# Patient Record
Sex: MALE | Race: BLACK OR AFRICAN AMERICAN | NOT HISPANIC OR LATINO | URBAN - METROPOLITAN AREA
[De-identification: names, ages, dates, MRNs, and addresses within clinical notes are randomized per-mention and may not be internally consistent; named-entity substitution may affect disease eponyms.]

---

## 2022-07-11 ENCOUNTER — EMERGENCY (EMERGENCY)
Facility: HOSPITAL | Age: 42
LOS: 1 days | Discharge: HOME | End: 2022-07-11
Attending: EMERGENCY MEDICINE | Admitting: EMERGENCY MEDICINE
Payer: COMMERCIAL

## 2022-07-11 VITALS
SYSTOLIC BLOOD PRESSURE: 116 MMHG | DIASTOLIC BLOOD PRESSURE: 79 MMHG | RESPIRATION RATE: 18 BRPM | TEMPERATURE: 98 F | OXYGEN SATURATION: 100 % | HEART RATE: 57 BPM

## 2022-07-11 VITALS
RESPIRATION RATE: 17 BRPM | TEMPERATURE: 97 F | SYSTOLIC BLOOD PRESSURE: 121 MMHG | OXYGEN SATURATION: 100 % | DIASTOLIC BLOOD PRESSURE: 96 MMHG | HEART RATE: 51 BPM

## 2022-07-11 LAB
ALBUMIN SERPL ELPH-MCNC: 4.5 G/DL — SIGNIFICANT CHANGE UP (ref 3.3–5)
ALP SERPL-CCNC: 61 U/L — SIGNIFICANT CHANGE UP (ref 40–120)
ALT FLD-CCNC: 13 U/L — SIGNIFICANT CHANGE UP (ref 4–41)
ANION GAP SERPL CALC-SCNC: 8 MMOL/L — SIGNIFICANT CHANGE UP (ref 7–14)
AST SERPL-CCNC: 18 U/L — SIGNIFICANT CHANGE UP (ref 4–40)
BASOPHILS # BLD AUTO: 0.06 K/UL — SIGNIFICANT CHANGE UP (ref 0–0.2)
BASOPHILS NFR BLD AUTO: 1.3 % — SIGNIFICANT CHANGE UP (ref 0–2)
BILIRUB SERPL-MCNC: 0.3 MG/DL — SIGNIFICANT CHANGE UP (ref 0.2–1.2)
BUN SERPL-MCNC: 9 MG/DL — SIGNIFICANT CHANGE UP (ref 7–23)
CALCIUM SERPL-MCNC: 9 MG/DL — SIGNIFICANT CHANGE UP (ref 8.4–10.5)
CHLORIDE SERPL-SCNC: 104 MMOL/L — SIGNIFICANT CHANGE UP (ref 98–107)
CO2 SERPL-SCNC: 27 MMOL/L — SIGNIFICANT CHANGE UP (ref 22–31)
CREAT SERPL-MCNC: 1.21 MG/DL — SIGNIFICANT CHANGE UP (ref 0.5–1.3)
D DIMER BLD IA.RAPID-MCNC: <150 NG/ML DDU — SIGNIFICANT CHANGE UP
EGFR: 76 ML/MIN/1.73M2 — SIGNIFICANT CHANGE UP
EOSINOPHIL # BLD AUTO: 0.12 K/UL — SIGNIFICANT CHANGE UP (ref 0–0.5)
EOSINOPHIL NFR BLD AUTO: 2.5 % — SIGNIFICANT CHANGE UP (ref 0–6)
GLUCOSE SERPL-MCNC: 91 MG/DL — SIGNIFICANT CHANGE UP (ref 70–99)
HCT VFR BLD CALC: 44.8 % — SIGNIFICANT CHANGE UP (ref 39–50)
HGB BLD-MCNC: 13.8 G/DL — SIGNIFICANT CHANGE UP (ref 13–17)
IANC: 2.57 K/UL — SIGNIFICANT CHANGE UP (ref 1.8–7.4)
IMM GRANULOCYTES NFR BLD AUTO: 0.2 % — SIGNIFICANT CHANGE UP (ref 0–1.5)
LYMPHOCYTES # BLD AUTO: 1.45 K/UL — SIGNIFICANT CHANGE UP (ref 1–3.3)
LYMPHOCYTES # BLD AUTO: 30.5 % — SIGNIFICANT CHANGE UP (ref 13–44)
MCHC RBC-ENTMCNC: 26.4 PG — LOW (ref 27–34)
MCHC RBC-ENTMCNC: 30.8 GM/DL — LOW (ref 32–36)
MCV RBC AUTO: 85.7 FL — SIGNIFICANT CHANGE UP (ref 80–100)
MONOCYTES # BLD AUTO: 0.55 K/UL — SIGNIFICANT CHANGE UP (ref 0–0.9)
MONOCYTES NFR BLD AUTO: 11.6 % — SIGNIFICANT CHANGE UP (ref 2–14)
NEUTROPHILS # BLD AUTO: 2.57 K/UL — SIGNIFICANT CHANGE UP (ref 1.8–7.4)
NEUTROPHILS NFR BLD AUTO: 53.9 % — SIGNIFICANT CHANGE UP (ref 43–77)
NRBC # BLD: 0 /100 WBCS — SIGNIFICANT CHANGE UP
NRBC # FLD: 0 K/UL — SIGNIFICANT CHANGE UP
PLATELET # BLD AUTO: 221 K/UL — SIGNIFICANT CHANGE UP (ref 150–400)
POTASSIUM SERPL-MCNC: 4.3 MMOL/L — SIGNIFICANT CHANGE UP (ref 3.5–5.3)
POTASSIUM SERPL-SCNC: 4.3 MMOL/L — SIGNIFICANT CHANGE UP (ref 3.5–5.3)
PROT SERPL-MCNC: 7.6 G/DL — SIGNIFICANT CHANGE UP (ref 6–8.3)
RBC # BLD: 5.23 M/UL — SIGNIFICANT CHANGE UP (ref 4.2–5.8)
RBC # FLD: 14.2 % — SIGNIFICANT CHANGE UP (ref 10.3–14.5)
SODIUM SERPL-SCNC: 139 MMOL/L — SIGNIFICANT CHANGE UP (ref 135–145)
TROPONIN T, HIGH SENSITIVITY RESULT: <6 NG/L — SIGNIFICANT CHANGE UP
WBC # BLD: 4.76 K/UL — SIGNIFICANT CHANGE UP (ref 3.8–10.5)
WBC # FLD AUTO: 4.76 K/UL — SIGNIFICANT CHANGE UP (ref 3.8–10.5)

## 2022-07-11 PROCEDURE — 93010 ELECTROCARDIOGRAM REPORT: CPT

## 2022-07-11 PROCEDURE — 99285 EMERGENCY DEPT VISIT HI MDM: CPT | Mod: 25

## 2022-07-11 PROCEDURE — 71046 X-RAY EXAM CHEST 2 VIEWS: CPT | Mod: 26

## 2022-07-11 RX ORDER — IBUPROFEN 200 MG
600 TABLET ORAL ONCE
Refills: 0 | Status: COMPLETED | OUTPATIENT
Start: 2022-07-11 | End: 2022-07-11

## 2022-07-11 RX ADMIN — Medication 600 MILLIGRAM(S): at 10:27

## 2022-07-11 NOTE — ED PROVIDER NOTE - PROGRESS NOTE DETAILS
FRANCES Gaytan: d-dimer and troponin negative, CXR clear lungs. Pt reports improvement in symptoms. Will d/c home with PMD follow up

## 2022-07-11 NOTE — ED PROVIDER NOTE - CLINICAL SUMMARY MEDICAL DECISION MAKING FREE TEXT BOX
42yM w/no stated pmhx presenting with left sided chest pain x 4 days, worse with inspiration, bending forward and laying on the right side. On exam pt is well appearing, EKG non ischemic, lungs clear to auscultation b/l. Concern for MSK type pain, low risk for ACS, PERC neg but given pleuritic pain will r/o with d-dimer. Plan: cbc/cmp/trop/d-dimer, CXR, IBU for pain.

## 2022-07-11 NOTE — ED ADULT NURSE NOTE - OBJECTIVE STATEMENT
pt received at intake rm 9 AAO x 3. pt c/o constant left sided chest pain x 3 days. pt states pain is worse with movement. pt denies sob, n/v/d, fevers, chills, cough. respirations even and unlabored. 20g iv placed to left ac. will continue to monitor.

## 2022-07-11 NOTE — ED PROVIDER NOTE - PATIENT PORTAL LINK FT
You can access the FollowMyHealth Patient Portal offered by [facility] by registering at the following website: [email or web address]. By joining [employer]’s FollowMyHealth portal, you will also be able to view your health information using other applications (apps) compatible with our system.

## 2022-07-11 NOTE — ED ADULT TRIAGE NOTE - CHIEF COMPLAINT QUOTE
pt c/o L sided Chest pain starting 4 days ago. states pain is sharp with inspiration and relieved when lying on his back.

## 2022-07-11 NOTE — ED PROVIDER NOTE - NSFOLLOWUPINSTRUCTIONS_ED_ALL_ED_FT
Follow up with your primary care doctor within 1 week  Take Ibuprofen 600mg every 6-8 hours as needed for pain, take with food  -You can also take Tylenol 650mg every 6 hours for pain  Return to the ER with any worsening or concerning symptoms, increased pain, shortness of breath, weakness, feeling faint or any other concerns.

## 2022-07-11 NOTE — ED PROVIDER NOTE - NS ED ATTENDING STATEMENT MOD
This was a shared visit with the KELECHI. I reviewed and verified the documentation and independently performed the documented:

## 2022-07-11 NOTE — ED PROVIDER NOTE - ATTENDING APP SHARED VISIT CONTRIBUTION OF CARE
agree with PA note    " 42yM w/no stated pmhx presenting with left sided chest pain x 4 days. Pt reports constant chest pain to the left lateral chest wall, at times pain is sharp worse with deep inspiration, bending forward and laying on the right side. Pt has been taking pepcid at home without relief. Pt reports feeling short of breath 2 nights ago. Denies fever/chills, palpitations, dizziness, weakness, near syncope, leg pain or swelling, recent strenuous activity or heavy lifting, abd pain, n/v/d, numbness, fam hx CAD or any other concerns."    PE: well appearing; VSS; CTAB/L; s1 s2 no m/r/g abd soft/NT/ND ext: no edema    EKG: NSR no ST deviation    IMp: pleuritic CP; will get labs, including trop, d-dimer as pain is pleuritic and PE is on differential; and CXR and reassess

## 2023-08-11 NOTE — ED ADULT TRIAGE NOTE - PAIN RATING/NUMBER SCALE (0-10): REST
6
[FreeTextEntry1] : 7/19/23 Plan: LLE Wash wound with soap and water Cover with Sorbion Sachet XL. Theo/ACE wrap Wrap bilateral lower extremities with Theo/ACE Change dressing 3 times per week. Leg elevation as tolerated Encouraged ambulation or exercise. Optimization of nutrition. Home care orders in place The patient was  counseled as to the signs and symptoms of infection, and instructed  in the event they suspect new or worsening infection or if the patient is significantly ill (fever, altered mental status, etc.), to present to the nearest ED.  Follow up appointment scheduled for 4 weeks  8-11-23: BLE: Wash wound with soap and water LLE: Cover with Sorbion Sachet XL. Theo/ACE wrap RLE: medihoney/adaptic theo.  Pt doesnt want ace on this leg Change dressing 3 times per week. Leg elevation as tolerated Encouraged ambulation or exercise. Optimization of nutrition. Nursing orders given The patient was  counseled as to the signs and symptoms of infection, and instructed  in the event they suspect new or worsening infection or if the patient is significantly ill (fever, altered mental status, etc.), to present to the nearest ED.  f/u 4 weeks   :

## 2023-12-01 NOTE — ED ADULT NURSE NOTE - MODE OF DISCHARGE
Pt with hypotension, IV fluids given in ED and responding well. Will monitor and start vasopressors if necessary   CT of head ordered   Carotid ultrasound ordered   Updated echo pending   Orthostatics pending   Neuro checks   Fall precautions   PT/OT consulted      Ambulatory